# Patient Record
Sex: FEMALE | Race: WHITE
[De-identification: names, ages, dates, MRNs, and addresses within clinical notes are randomized per-mention and may not be internally consistent; named-entity substitution may affect disease eponyms.]

---

## 2018-12-05 ENCOUNTER — HOSPITAL ENCOUNTER (OUTPATIENT)
Dept: HOSPITAL 62 - OROUT | Age: 33
Discharge: HOME | End: 2018-12-05
Attending: OBSTETRICS & GYNECOLOGY
Payer: MEDICAID

## 2018-12-05 VITALS — DIASTOLIC BLOOD PRESSURE: 72 MMHG | SYSTOLIC BLOOD PRESSURE: 102 MMHG

## 2018-12-05 DIAGNOSIS — Z79.1: ICD-10-CM

## 2018-12-05 DIAGNOSIS — Z79.891: ICD-10-CM

## 2018-12-05 DIAGNOSIS — O03.4: Primary | ICD-10-CM

## 2018-12-05 LAB
ADD MANUAL DIFF: NO
APPEARANCE UR: CLEAR
APTT PPP: YELLOW S
BASOPHILS # BLD AUTO: 0.1 10^3/UL (ref 0–0.2)
BASOPHILS NFR BLD AUTO: 0.9 % (ref 0–2)
BILIRUB UR QL STRIP: NEGATIVE
EOSINOPHIL # BLD AUTO: 0.3 10^3/UL (ref 0–0.6)
EOSINOPHIL NFR BLD AUTO: 5.3 % (ref 0–6)
ERYTHROCYTE [DISTWIDTH] IN BLOOD BY AUTOMATED COUNT: 13.7 % (ref 11.5–14)
GLUCOSE UR STRIP-MCNC: NEGATIVE MG/DL
HCT VFR BLD CALC: 38.7 % (ref 36–47)
HGB BLD-MCNC: 13.6 G/DL (ref 12–15.5)
KETONES UR STRIP-MCNC: NEGATIVE MG/DL
LYMPHOCYTES # BLD AUTO: 1.9 10^3/UL (ref 0.5–4.7)
LYMPHOCYTES NFR BLD AUTO: 32 % (ref 13–45)
MCH RBC QN AUTO: 28.9 PG (ref 27–33.4)
MCHC RBC AUTO-ENTMCNC: 35.3 G/DL (ref 32–36)
MCV RBC AUTO: 82 FL (ref 80–97)
MONOCYTES # BLD AUTO: 0.4 10^3/UL (ref 0.1–1.4)
MONOCYTES NFR BLD AUTO: 6.6 % (ref 3–13)
NEUTROPHILS # BLD AUTO: 3.3 10^3/UL (ref 1.7–8.2)
NEUTS SEG NFR BLD AUTO: 55.2 % (ref 42–78)
NITRITE UR QL STRIP: NEGATIVE
PH UR STRIP: 6 [PH] (ref 5–9)
PLATELET # BLD: 307 10^3/UL (ref 150–450)
PROT UR STRIP-MCNC: NEGATIVE MG/DL
RBC # BLD AUTO: 4.73 10^6/UL (ref 3.72–5.28)
SP GR UR STRIP: 1.01
TOTAL CELLS COUNTED % (AUTO): 100 %
UROBILINOGEN UR-MCNC: NEGATIVE MG/DL (ref ?–2)
WBC # BLD AUTO: 6 10^3/UL (ref 4–10.5)

## 2018-12-05 PROCEDURE — 81001 URINALYSIS AUTO W/SCOPE: CPT

## 2018-12-05 PROCEDURE — 36415 COLL VENOUS BLD VENIPUNCTURE: CPT

## 2018-12-05 PROCEDURE — 85025 COMPLETE CBC W/AUTO DIFF WBC: CPT

## 2018-12-05 PROCEDURE — 88305 TISSUE EXAM BY PATHOLOGIST: CPT

## 2018-12-05 PROCEDURE — 59812 TREATMENT OF MISCARRIAGE: CPT

## 2020-04-13 LAB
ADD MANUAL DIFF: NO
APPEARANCE UR: CLEAR
APTT PPP: (no result) S
BARBITURATES UR QL SCN: NEGATIVE
BASOPHILS # BLD AUTO: 0.1 10^3/UL (ref 0–0.2)
BASOPHILS NFR BLD AUTO: 0.8 % (ref 0–2)
BILIRUB UR QL STRIP: NEGATIVE
EOSINOPHIL # BLD AUTO: 0.1 10^3/UL (ref 0–0.6)
EOSINOPHIL NFR BLD AUTO: 0.8 % (ref 0–6)
ERYTHROCYTE [DISTWIDTH] IN BLOOD BY AUTOMATED COUNT: 14.2 % (ref 11.5–14)
GLUCOSE UR STRIP-MCNC: NEGATIVE MG/DL
HCT VFR BLD CALC: 37.6 % (ref 36–47)
HGB BLD-MCNC: 13.7 G/DL (ref 12–15.5)
KETONES UR STRIP-MCNC: NEGATIVE MG/DL
LYMPHOCYTES # BLD AUTO: 2.3 10^3/UL (ref 0.5–4.7)
LYMPHOCYTES NFR BLD AUTO: 29.2 % (ref 13–45)
MCH RBC QN AUTO: 31.8 PG (ref 27–33.4)
MCHC RBC AUTO-ENTMCNC: 36.5 G/DL (ref 32–36)
MCV RBC AUTO: 87 FL (ref 80–97)
METHADONE UR QL SCN: NEGATIVE
MONOCYTES # BLD AUTO: 0.6 10^3/UL (ref 0.1–1.4)
MONOCYTES NFR BLD AUTO: 7.5 % (ref 3–13)
NEUTROPHILS # BLD AUTO: 4.8 10^3/UL (ref 1.7–8.2)
NEUTS SEG NFR BLD AUTO: 61.7 % (ref 42–78)
NITRITE UR QL STRIP: NEGATIVE
PCP UR QL SCN: NEGATIVE
PH UR STRIP: 6 [PH] (ref 5–9)
PLATELET # BLD: 205 10^3/UL (ref 150–450)
PROT UR STRIP-MCNC: NEGATIVE MG/DL
RBC # BLD AUTO: 4.31 10^6/UL (ref 3.72–5.28)
SP GR UR STRIP: 1
TOTAL CELLS COUNTED % (AUTO): 100 %
URINE AMPHETAMINES SCREEN: NEGATIVE
URINE BENZODIAZEPINES SCREEN: NEGATIVE
URINE COCAINE SCREEN: NEGATIVE
URINE MARIJUANA (THC) SCREEN: NEGATIVE
UROBILINOGEN UR-MCNC: NEGATIVE MG/DL (ref ?–2)
WBC # BLD AUTO: 7.8 10^3/UL (ref 4–10.5)

## 2020-04-15 ENCOUNTER — HOSPITAL ENCOUNTER (INPATIENT)
Dept: HOSPITAL 62 - 2S | Age: 35
LOS: 2 days | Discharge: HOME | End: 2020-04-17
Attending: OBSTETRICS & GYNECOLOGY | Admitting: OBSTETRICS & GYNECOLOGY
Payer: MEDICAID

## 2020-04-15 DIAGNOSIS — O34.211: Primary | ICD-10-CM

## 2020-04-15 DIAGNOSIS — Z3A.39: ICD-10-CM

## 2020-04-15 DIAGNOSIS — N85.8: ICD-10-CM

## 2020-04-15 PROCEDURE — 85025 COMPLETE CBC W/AUTO DIFF WBC: CPT

## 2020-04-15 PROCEDURE — 86900 BLOOD TYPING SEROLOGIC ABO: CPT

## 2020-04-15 PROCEDURE — 36415 COLL VENOUS BLD VENIPUNCTURE: CPT

## 2020-04-15 PROCEDURE — C1713 ANCHOR/SCREW BN/BN,TIS/BN: HCPCS

## 2020-04-15 PROCEDURE — 85027 COMPLETE CBC AUTOMATED: CPT

## 2020-04-15 PROCEDURE — 80307 DRUG TEST PRSMV CHEM ANLYZR: CPT

## 2020-04-15 PROCEDURE — 86850 RBC ANTIBODY SCREEN: CPT

## 2020-04-15 PROCEDURE — 59025 FETAL NON-STRESS TEST: CPT

## 2020-04-15 PROCEDURE — 86901 BLOOD TYPING SEROLOGIC RH(D): CPT

## 2020-04-15 PROCEDURE — 81001 URINALYSIS AUTO W/SCOPE: CPT

## 2020-04-15 PROCEDURE — 94799 UNLISTED PULMONARY SVC/PX: CPT

## 2020-04-15 RX ADMIN — OXYCODONE AND ACETAMINOPHEN PRN TAB: 5; 325 TABLET ORAL at 14:26

## 2020-04-15 RX ADMIN — FENTANYL CITRATE ONE MCG: 50 INJECTION INTRAMUSCULAR; INTRAVENOUS at 10:01

## 2020-04-15 RX ADMIN — KETOROLAC TROMETHAMINE SCH MG: 30 INJECTION, SOLUTION INTRAMUSCULAR at 11:33

## 2020-04-15 RX ADMIN — FENTANYL CITRATE ONE MCG: 50 INJECTION INTRAMUSCULAR; INTRAVENOUS at 10:06

## 2020-04-15 RX ADMIN — FENTANYL CITRATE ONE MCG: 50 INJECTION INTRAMUSCULAR; INTRAVENOUS at 09:56

## 2020-04-15 RX ADMIN — OXYCODONE AND ACETAMINOPHEN PRN TAB: 5; 325 TABLET ORAL at 19:30

## 2020-04-15 RX ADMIN — KETOROLAC TROMETHAMINE SCH: 30 INJECTION, SOLUTION INTRAMUSCULAR at 11:17

## 2020-04-15 RX ADMIN — KETOROLAC TROMETHAMINE SCH MG: 30 INJECTION, SOLUTION INTRAMUSCULAR at 17:40

## 2020-04-15 RX ADMIN — OXYCODONE AND ACETAMINOPHEN PRN TAB: 5; 325 TABLET ORAL at 23:51

## 2020-04-15 RX ADMIN — DOCUSATE SODIUM SCH MG: 100 CAPSULE, LIQUID FILLED ORAL at 17:41

## 2020-04-15 NOTE — OPERATIVE REPORT
Operative Report


DATE OF SURGERY: 04/15/20


PREOPERATIVE DIAGNOSIS: Repeat  to prevent risk of uterine rupture


POSTOPERATIVE DIAGNOSIS: Same


OPERATION: Repeat  via low transverse uterine incision


SURGEON: HERMINIO MCKEON


ANESTHESIA: Spinal


TISSUE REMOVED OR ALTERED: Placenta


COMPLICATIONS: 





None


ESTIMATED BLOOD LOSS: 250 cc


INTRAOPERATIVE FINDINGS: Viable infant crying at delivery, normal uterus tubes 

and ovaries


PROCEDURE: 





Patient was taken to the OR and placed in supine position after her spinal 

anesthesia.  She is prepared and draped in sterile fashion.  Fletcher was placed 

for drainage of the bladder.  Low transverse incision was made and carried down 

the level of the fascia.  The fascial incision was made with knife and extended 

bilaterally with curved Patricio scissors.  The fascia was  off the rectus 

muscles using sharp and blunt dissection.  The rectus muscles are  in 

the midline.  The peritoneum was entered without incident.  Bladder blade was 

placed in uterine segment was identified.  A low transverse incision was made 

creating a bladder flap.  Bladder blade was placed low transverse uterine 

incision was made with the knife and extended with fingertips.  The baby was 

delivered with some fundal pressure.  Mouth and nose were suctioned free.  The 

cord is doubly clamped and cut.  Baby is passed off to the pediatrician in 

attendance.  The placenta was manually extracted with trailing membranes.  The 

uterus was externalized  wrapped in a moist lap sponge.  Uterine contents wiped 

free.  Uterus was closed with a running locking layer of 0 chromic suture using 

the second layer to imbricate the first completing a double layer closure of the

uterus.  The serosa was closed with a running 2-0 chromic stitch.  The pelvis 

was irrigated and suctioned free of fluid the uterus was replaced in the 

abdomen.  The abdominal wall peritoneum was closed with running 2-0 chromic 

stitch.  Fascia was closed with a running 0 Vicryl in 2 segments.  Carolina's 

layer was brought together with 0 plain gut stitch and the skin was closed with 

running subcuticular 4-0 undyed Vicryl stitch.  The wound was dressed mother and

baby did well.

## 2020-04-16 LAB
ERYTHROCYTE [DISTWIDTH] IN BLOOD BY AUTOMATED COUNT: 14.4 % (ref 11.5–14)
HCT VFR BLD CALC: 32.8 % (ref 36–47)
HGB BLD-MCNC: 12 G/DL (ref 12–15.5)
MCH RBC QN AUTO: 32.1 PG (ref 27–33.4)
MCHC RBC AUTO-ENTMCNC: 36.4 G/DL (ref 32–36)
MCV RBC AUTO: 88 FL (ref 80–97)
PLATELET # BLD: 180 10^3/UL (ref 150–450)
RBC # BLD AUTO: 3.73 10^6/UL (ref 3.72–5.28)
WBC # BLD AUTO: 9.5 10^3/UL (ref 4–10.5)

## 2020-04-16 RX ADMIN — OXYCODONE AND ACETAMINOPHEN PRN TAB: 5; 325 TABLET ORAL at 16:05

## 2020-04-16 RX ADMIN — OXYCODONE AND ACETAMINOPHEN PRN TAB: 5; 325 TABLET ORAL at 21:43

## 2020-04-16 RX ADMIN — KETOROLAC TROMETHAMINE SCH MG: 30 INJECTION, SOLUTION INTRAMUSCULAR at 02:02

## 2020-04-16 RX ADMIN — IBUPROFEN SCH MG: 800 TABLET, FILM COATED ORAL at 17:50

## 2020-04-16 RX ADMIN — IBUPROFEN SCH MG: 800 TABLET, FILM COATED ORAL at 11:04

## 2020-04-16 RX ADMIN — OXYCODONE AND ACETAMINOPHEN PRN TAB: 5; 325 TABLET ORAL at 10:56

## 2020-04-16 RX ADMIN — Medication SCH CAP: at 09:45

## 2020-04-16 RX ADMIN — OXYCODONE AND ACETAMINOPHEN PRN TAB: 5; 325 TABLET ORAL at 06:24

## 2020-04-16 RX ADMIN — DOCUSATE SODIUM SCH MG: 100 CAPSULE, LIQUID FILLED ORAL at 17:50

## 2020-04-16 RX ADMIN — DOCUSATE SODIUM SCH MG: 100 CAPSULE, LIQUID FILLED ORAL at 09:45

## 2020-04-16 NOTE — PDOC PROGRESS REPORT
Subjective-OB


Progress Note for:: 20


Subjective: 


Pt doing well, no concerns. She reports light bleeding reg diet with + flatus 

and voiding without difficulty. 








Physical Exam (OB)


Vital Signs: 


                                        











Temp Pulse Resp BP Pulse Ox


 


 98.0 F   84   16   103/70   99 


 


 20 07:47  20 07:47  20 07:47  20 07:47  20 07:47








                                 Intake & Output











 04/15/20 04/16/20 04/17/20





 06:59 06:59 06:59


 


Intake Total 1000 4106 


 


Output Total  2763 


 


Balance 1000 1343 


 


Weight 189.6 kg  














- 


Dressing Removed: No


Closure Type: opsite





- Lochia


Lochia Amount: Scant < 10 ml


Lochia Color: Rubra/Red





- Abdomen


Description: Tender, Soft


Fundal Description: Firm, Midline


Fundal Height: u/u - u/2





Objective-Diagnostic


Laboratory: 


                                        





                                 20 06:50 





                                        











  20





  06:50


 


WBC  9.5


 


RBC  3.73


 


Hgb  12.0


 


Hct  32.8 L


 


MCV  88


 


MCH  32.1


 


MCHC  36.4 H


 


RDW  14.4 H


 


Plt Count  180














Assessment and Plan(PN)





- Assessment and Plan


(1) S/P repeat low transverse 


Is this a current diagnosis for this admission?: Yes   





(2) Morbid obesity


Is this a current diagnosis for this admission?: Yes   





- Time Spent with Patient


Time with patient: Less than 15 minutes


Medications reviewed and adjusted accordingly: Yes





- Disposition


Anticipated Discharge: Home


Within: within 24 hours

## 2020-04-17 VITALS — DIASTOLIC BLOOD PRESSURE: 69 MMHG | SYSTOLIC BLOOD PRESSURE: 117 MMHG

## 2020-04-17 RX ADMIN — IBUPROFEN SCH MG: 800 TABLET, FILM COATED ORAL at 05:28

## 2020-04-17 RX ADMIN — IBUPROFEN SCH MG: 800 TABLET, FILM COATED ORAL at 12:11

## 2020-04-17 RX ADMIN — Medication SCH CAP: at 09:02

## 2020-04-17 RX ADMIN — OXYCODONE AND ACETAMINOPHEN PRN TAB: 5; 325 TABLET ORAL at 09:06

## 2020-04-17 RX ADMIN — OXYCODONE AND ACETAMINOPHEN PRN TAB: 5; 325 TABLET ORAL at 04:01

## 2020-04-17 RX ADMIN — DOCUSATE SODIUM SCH MG: 100 CAPSULE, LIQUID FILLED ORAL at 09:02

## 2020-04-17 RX ADMIN — IBUPROFEN SCH MG: 800 TABLET, FILM COATED ORAL at 00:09

## 2020-04-17 NOTE — PDOC DISCHARGE SUMMARY
Impression





- Admit/DC Date/PCP


Admission Date/Primary Care Provider: 


  04/15/20 05:10





  RAE NIX DO





Discharge Date: 20





- Discharge Diagnosis


(1) S/P repeat low transverse 


Is this a current diagnosis for this admission?: Yes   





(2) Normal postpartum course


Is this a current diagnosis for this admission?: Yes   





- Additional Information


Resuscitation Status: Full Code


Discharge Diet: Regular


Discharge Activity: Balance Activity w/Rest, No Lifting Over 10 Pounds, No 

Lifting/Push/Pulling, Pelvic Rest, No tub bath


Referrals: 


RAE NIX DO [Primary Care Provider] - 


Prescriptions: 


Oxycodone HCl/Acetaminophen [Percocet 5-325 mg Tablet] 1 tab PO Q4HP PRN #30 

tablet


 PRN Reason: For Pain Scale 3-5


Ibuprofen [Motrin 800 mg Tablet] 800 mg PO Q8HP PRN #60 tablet


 PRN Reason: For Pain Scale 1-3


Home Medications: 








Ibuprofen [Motrin 800 mg Tablet] 800 mg PO Q8HP PRN #60 tablet 20 


Oxycodone HCl/Acetaminophen [Percocet 5-325 mg Tablet] 1 tab PO Q4HP PRN #30 

tablet 20 











HPI


Reason(s) for Admission: Ceasarean Section-Repeat


Prenatal Procedures: None


Intrapartum Procedure(s): : Low Cervical, Transverse





Results


Laboratory Results: 


                                        











WBC  9.5 10^3/uL (4.0-10.5)   20  06:50    


 


RBC  3.73 10^6/uL (3.72-5.28)   20  06:50    


 


Hgb  12.0 g/dL (12.0-15.5)   20  06:50    


 


Hct  32.8 % (36.0-47.0)  L  20  06:50    


 


MCV  88 fl (80-97)   20  06:50    


 


MCH  32.1 pg (27.0-33.4)   20  06:50    


 


MCHC  36.4 g/dL (32.0-36.0)  H  20  06:50    


 


RDW  14.4 % (11.5-14.0)  H  20  06:50    


 


Plt Count  180 10^3/uL (150-450)   20  06:50    


 


Lymph % (Auto)  29.2 % (13-45)   20  14:40    


 


Mono % (Auto)  7.5 % (3-13)   20  14:40    


 


Eos % (Auto)  0.8 % (0-6)   20  14:40    


 


Baso % (Auto)  0.8 % (0-2)   20  14:40    


 


Absolute Neuts (auto)  4.8 10^3/uL (1.7-8.2)   20  14:40    


 


Absolute Lymphs (auto)  2.3 10^3/uL (0.5-4.7)   20  14:40    


 


Absolute Monos (auto)  0.6 10^3/uL (0.1-1.4)   20  14:40    


 


Absolute Eos (auto)  0.1 10^3/uL (0.0-0.6)   20  14:40    


 


Absolute Basos (auto)  0.1 10^3/uL (0.0-0.2)   20  14:40    


 


Seg Neutrophils %  61.7 % (42-78)   20  14:40    


 


Urine Color  STRAW   20  14:35    


 


Urine Appearance  CLEAR   20  14:35    


 


Urine pH  6.0  (5.0-9.0)   20  14:35    


 


Ur Specific Gravity  1.003   20  14:35    


 


Urine Protein  NEGATIVE mg/dL (NEGATIVE)   20  14:35    


 


Urine Glucose (UA)  NEGATIVE mg/dL (NEGATIVE)   20  14:35    


 


Urine Ketones  NEGATIVE mg/dL (NEGATIVE)   20  14:35    


 


Urine Blood  NEGATIVE  (NEGATIVE)   20  14:35    


 


Urine Nitrite  NEGATIVE  (NEGATIVE)   20  14:35    


 


Urine Bilirubin  NEGATIVE  (NEGATIVE)   20  14:35    


 


Urine Urobilinogen  NEGATIVE mg/dL (<2.0)   20  14:35    


 


Ur Leukocyte Esterase  NEGATIVE  (NEGATIVE)   20  14:35    


 


Urine WBC (Auto)  1 /HPF  20  14:35    


 


Urine RBC (Auto)  1 /HPF  20  14:35    


 


Urine Bacteria (Auto)  TRACE /HPF  20  14:35    


 


Squamous Epi Cells Auto  5 /HPF  20  14:35    


 


Urine Mucus (Auto)  RARE /LPF  20  14:35    


 


Urine Ascorbic Acid  NEGATIVE  (NEGATIVE)   20  14:35    


 


Urine Opiates Screen  NEGATIVE   20  14:35    


 


Urine Methadone Screen  NEGATIVE   20  14:35    


 


Ur Barbiturates Screen  NEGATIVE   20  14:35    


 


Ur Phencyclidine Scrn  NEGATIVE   20  14:35    


 


Ur Amphetamines Screen  NEGATIVE   20  14:35    


 


U Benzodiazepines Scrn  NEGATIVE   20  14:35    


 


Urine Cocaine Screen  NEGATIVE   20  14:35    


 


U Marijuana (THC) Screen  NEGATIVE   20  14:35    


 


Blood Type  A POSITIVE   20  13:04    


 


Antibody Screen  NEGATIVE   20  13:04    














Plan


Plan of Treatment: 


f/u at [facility] 20


Time Spent: Less than 30 Minutes